# Patient Record
Sex: FEMALE | Race: WHITE | NOT HISPANIC OR LATINO | Employment: STUDENT | URBAN - METROPOLITAN AREA
[De-identification: names, ages, dates, MRNs, and addresses within clinical notes are randomized per-mention and may not be internally consistent; named-entity substitution may affect disease eponyms.]

---
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
## 2019-01-10 ENCOUNTER — OFFICE VISIT (OUTPATIENT)
Dept: OPHTHALMOLOGY | Facility: CLINIC | Age: 19
End: 2019-01-10
Payer: MEDICAID

## 2019-01-10 DIAGNOSIS — H50.32 ESOTROPIA, INTERMITTENT, ALTERNATING: Primary | ICD-10-CM

## 2019-01-10 PROCEDURE — 92060 PR SPECIAL EYE EVAL,SENSORIMOTOR: ICD-10-PCS | Mod: ,,, | Performed by: OPHTHALMOLOGY

## 2019-01-10 PROCEDURE — 92012 PR EYE EXAM, EST PATIENT,INTERMED: ICD-10-PCS | Mod: ,,, | Performed by: OPHTHALMOLOGY

## 2019-01-10 PROCEDURE — 92060 SENSORIMOTOR EXAMINATION: CPT | Mod: ,,, | Performed by: OPHTHALMOLOGY

## 2019-01-10 PROCEDURE — 92012 INTRM OPH EXAM EST PATIENT: CPT | Mod: ,,, | Performed by: OPHTHALMOLOGY

## 2019-01-10 RX ORDER — AMOXICILLIN 500 MG/1
500 CAPSULE ORAL 3 TIMES DAILY
Refills: 0 | COMMUNITY
Start: 2019-01-07

## 2019-01-10 NOTE — PROGRESS NOTES
HPI     04/05/18   17 Y/O F here today with concerns that glasses aren't helping   with diplopia and images are side by side and gets headaches w/o glasses   wear. stj  Has trouble refocusing from near to distance     +headaches   +blurred vision  -eye pain    Last edited by VASQUEZ Mosqueda Jr., MD on 1/10/2019 10:50 AM. (History)          ROS     Positive for: Eyes    Last edited by VASQUEZ Mosqueda Jr., MD on 1/10/2019 10:50 AM. (History)          Assessment /Plan     For exam results, see Encounter Report.    Esotropia, intermittent, alternating      Educated about alignment   Can consider increasing the prism correction or can Consider surgical correction to correct ET. The details of the surgical procedure were discussed. The risks of the procedure were identified and explained. Treatment alternatives were listed.    Procedure plan will be MR recession OU.  Use of adjustable suture to ensure a better outcome.      Betty understands procedure plan and would like to schedule surgery.     This service was scribed by Maria Luisa Strong for, and in the presence of Dr Mosqueda who personally performed this service.    Maria Luisa Strong, COA    Mar Mosqueda MD

## 2019-02-19 ENCOUNTER — TELEPHONE (OUTPATIENT)
Dept: OPHTHALMOLOGY | Facility: CLINIC | Age: 19
End: 2019-02-19

## 2019-02-19 NOTE — TELEPHONE ENCOUNTER
02/19/19  Trinh returned pt call but no answer and LVM. I did speak with pt mother and she notified me that pt is at work and will have her call me on tomorrow (02/20/19) regarding eye sx. stj 3:52p      ----- Message from Laurie Grider sent at 2/19/2019 11:35 AM CST -----  Contact: Betty Adams   Pt would like to speak with  nurse about the eye surgery ,pt can be reached at [phone] please thank you.

## 2019-02-19 NOTE — TELEPHONE ENCOUNTER
02/19/19  Trinh called and spoke with pt mother regarding scheduling appt for Strab. Repair. I have forwarded pt to [facility] for Scheduling in April. st 4:24p      ----- Message from Laurie Grider sent at 2/19/2019  4:01 PM CST -----  Contact: Betty Adams   Pt mother  returned the called back about the eye surgery,she can be reached at [phone] please thank you.

## 2019-02-27 ENCOUNTER — TELEPHONE (OUTPATIENT)
Dept: OPHTHALMOLOGY | Facility: CLINIC | Age: 19
End: 2019-02-27

## 2019-02-27 DIAGNOSIS — H50.32 ESOTROPIA, INTERMITTENT, ALTERNATING: Primary | ICD-10-CM

## 2019-02-28 ENCOUNTER — TELEPHONE (OUTPATIENT)
Dept: OPHTHALMOLOGY | Facility: CLINIC | Age: 19
End: 2019-02-28

## 2019-02-28 NOTE — TELEPHONE ENCOUNTER
----- Message from Vanda Abbott sent at 2/26/2019 10:31 AM CST -----  Contact: Betty  Needs Advice    Reason for call:Pt called to f/u on surgery, coverage, and costs.        Communication Preference:[phone]    Additional Information:    Spoke with pt.  Advised the surgery was booked as a non cosmetic procedure  Which is covered by Medicaid.  Advised that pre-services would call her if   She has any financial responsibility.  Pt agrees and understands.  [facility]

## 2019-04-23 ENCOUNTER — TELEPHONE (OUTPATIENT)
Dept: OPTOMETRY | Facility: CLINIC | Age: 19
End: 2019-04-23

## 2019-04-23 ENCOUNTER — TELEPHONE (OUTPATIENT)
Dept: OPHTHALMOLOGY | Facility: CLINIC | Age: 19
End: 2019-04-23

## 2019-04-23 NOTE — BRIEF OP NOTE
Brief Operative Note  Ophthalmology Service      Date of Procedure: 4/24/19     Attending Physician: VASQUEZ Mosqueda Jr., MD     Assistant: SIABELA Fernandez MD    Pre-Operative Diagnosis: Esotropia, intermittent, alternating [H50.32]     Post-Operative Diagnosis: Same as pre-operative diagnosis    Treatments/Procedures: Recess MR OD 5.0 mm wadj    Intraoperative Findings: nl EOM's    Anesthesia: General    Complications: None    Estimated Blood Loss: < 5 cc    Specimens: None    -------------------------------------------------------------  Full dictated Operative Report to follow.  -------------------------------------------------------------

## 2019-04-23 NOTE — TELEPHONE ENCOUNTER
----- Message from Laurie Grider sent at 4/23/2019 11:17 AM CDT -----  Contact: Betty Adams   Pt would like to speak with  nurse please about the time for eye surgery,pt can be reached at [phone]  please thank you.

## 2019-04-23 NOTE — OP NOTE
DATE OF PROCEDURE: 4/24/19   SURGEON:  VASQUEZ Mosqueda M.D.     ASSISTANT:  Dr. ISABELA Fernandez     PREOPERATIVE DIAGNOSIS:  Strabismus-esotropia.     POSTOPERATIVE DIAGNOSIS:  Strabismus-esotropia.     PROCEDURE:  Recession, medial rectus, R eye, 5.0 mm with adjustable suture,      COMPLICATIONS:  None.     BLOOD LOSS:  Less than 2 mL.     PROCEDURE IN DETAIL:  The patient was brought to the Operating Suite where   general intubation anesthesia was achieved.  Both eyes were prepped and draped   in sterile fashion, lid speculum placed in the right eye.  Through an inferior   nasal fornix incision, the rectus muscle was identified and placed on a muscle   hook.  It was cleared of its check ligaments anteriorly and posteriorly and   double-armed 6-0 Vicryl suture passed through the muscle belly 1 mm posterior to   the insertion.  Locked bites were placed in the middle and upper and lower edge   of the muscle.  The muscle was disinserted from the globe and reattached to the   sclera at the old insertion site.  A noose suture was placed around the muscle   suture and positioned 5.0 mm distally.  The muscle was allowed to retract back   this amount.  The conjunctiva was reapproximated and the sutures were buried.   Maxitrol ointment and   Betadine solution was placed in the eye.  The patient was brought to the   Recovery Room in good condition.

## 2019-04-24 ENCOUNTER — ANESTHESIA (OUTPATIENT)
Dept: SURGERY | Facility: HOSPITAL | Age: 19
End: 2019-04-24
Payer: MEDICAID

## 2019-04-24 ENCOUNTER — ANESTHESIA EVENT (OUTPATIENT)
Dept: SURGERY | Facility: HOSPITAL | Age: 19
End: 2019-04-24
Payer: MEDICAID

## 2019-04-24 ENCOUNTER — HOSPITAL ENCOUNTER (OUTPATIENT)
Facility: HOSPITAL | Age: 19
Discharge: HOME | End: 2019-04-24
Attending: OPHTHALMOLOGY | Admitting: OPHTHALMOLOGY
Payer: MEDICAID

## 2019-04-24 VITALS
SYSTOLIC BLOOD PRESSURE: 101 MMHG | HEIGHT: 61 IN | OXYGEN SATURATION: 99 % | TEMPERATURE: 98 F | HEART RATE: 57 BPM | WEIGHT: 140 LBS | DIASTOLIC BLOOD PRESSURE: 68 MMHG | RESPIRATION RATE: 17 BRPM | BODY MASS INDEX: 26.43 KG/M2

## 2019-04-24 DIAGNOSIS — H50.32 ESOTROPIA, INTERMITTENT, ALTERNATING: Primary | ICD-10-CM

## 2019-04-24 DIAGNOSIS — H50.00 ESOTROPIA: ICD-10-CM

## 2019-04-24 LAB
B-HCG UR QL: NEGATIVE
CTP QC/QA: YES

## 2019-04-24 PROCEDURE — 63600175 PHARM REV CODE 636 W HCPCS: Performed by: NURSE ANESTHETIST, CERTIFIED REGISTERED

## 2019-04-24 PROCEDURE — 71000044 HC DOSC ROUTINE RECOVERY FIRST HOUR: Performed by: OPHTHALMOLOGY

## 2019-04-24 PROCEDURE — 67335 EYE SUTURE DURING SURGERY: CPT | Mod: RT,,, | Performed by: OPHTHALMOLOGY

## 2019-04-24 PROCEDURE — 00140 ANES PROCEDURES ON EYE NOS: CPT | Performed by: OPHTHALMOLOGY

## 2019-04-24 PROCEDURE — 67311 PR STABISMUS SURG,ONE HORIZ MUSCLE: ICD-10-PCS | Mod: RT,,, | Performed by: OPHTHALMOLOGY

## 2019-04-24 PROCEDURE — D9220A PRA ANESTHESIA: Mod: ANES,,, | Performed by: ANESTHESIOLOGY

## 2019-04-24 PROCEDURE — 25000003 PHARM REV CODE 250: Performed by: NURSE ANESTHETIST, CERTIFIED REGISTERED

## 2019-04-24 PROCEDURE — 36000707: Performed by: OPHTHALMOLOGY

## 2019-04-24 PROCEDURE — 71000015 HC POSTOP RECOV 1ST HR: Performed by: OPHTHALMOLOGY

## 2019-04-24 PROCEDURE — 37000008 HC ANESTHESIA 1ST 15 MINUTES: Performed by: OPHTHALMOLOGY

## 2019-04-24 PROCEDURE — 37000009 HC ANESTHESIA EA ADD 15 MINS: Performed by: OPHTHALMOLOGY

## 2019-04-24 PROCEDURE — 63600175 PHARM REV CODE 636 W HCPCS: Performed by: ANESTHESIOLOGY

## 2019-04-24 PROCEDURE — 25000003 PHARM REV CODE 250: Performed by: OPHTHALMOLOGY

## 2019-04-24 PROCEDURE — 67335 PR STABISMUS SURG,PLACE ADJUST SUTURE: ICD-10-PCS | Mod: RT,,, | Performed by: OPHTHALMOLOGY

## 2019-04-24 PROCEDURE — 81025 URINE PREGNANCY TEST: CPT | Performed by: ANESTHESIOLOGY

## 2019-04-24 PROCEDURE — D9220A PRA ANESTHESIA: Mod: CRNA,,, | Performed by: NURSE ANESTHETIST, CERTIFIED REGISTERED

## 2019-04-24 PROCEDURE — 36000706: Performed by: OPHTHALMOLOGY

## 2019-04-24 PROCEDURE — D9220A PRA ANESTHESIA: ICD-10-PCS | Mod: ANES,,, | Performed by: ANESTHESIOLOGY

## 2019-04-24 PROCEDURE — 67311 REVISE EYE MUSCLE: CPT | Mod: RT,,, | Performed by: OPHTHALMOLOGY

## 2019-04-24 PROCEDURE — D9220A PRA ANESTHESIA: ICD-10-PCS | Mod: CRNA,,, | Performed by: NURSE ANESTHETIST, CERTIFIED REGISTERED

## 2019-04-24 RX ORDER — DEXAMETHASONE SODIUM PHOSPHATE 4 MG/ML
INJECTION, SOLUTION INTRA-ARTICULAR; INTRALESIONAL; INTRAMUSCULAR; INTRAVENOUS; SOFT TISSUE
Status: DISCONTINUED | OUTPATIENT
Start: 2019-04-24 | End: 2019-04-24

## 2019-04-24 RX ORDER — ACETAMINOPHEN 325 MG/1
650 TABLET ORAL ONCE AS NEEDED
Status: COMPLETED | OUTPATIENT
Start: 2019-04-24 | End: 2019-04-24

## 2019-04-24 RX ORDER — ACETAMINOPHEN 325 MG/1
TABLET ORAL
Status: DISCONTINUED
Start: 2019-04-24 | End: 2019-04-24 | Stop reason: HOSPADM

## 2019-04-24 RX ORDER — NEOMYCIN SULFATE, POLYMYXIN B SULFATE, AND DEXAMETHASONE 3.5; 10000; 1 MG/G; [USP'U]/G; MG/G
OINTMENT OPHTHALMIC
Status: DISCONTINUED | OUTPATIENT
Start: 2019-04-24 | End: 2019-04-24 | Stop reason: HOSPADM

## 2019-04-24 RX ORDER — NEOMYCIN SULFATE, POLYMYXIN B SULFATE, AND DEXAMETHASONE 3.5; 10000; 1 MG/G; [USP'U]/G; MG/G
OINTMENT OPHTHALMIC
Status: DISCONTINUED
Start: 2019-04-24 | End: 2019-04-24 | Stop reason: HOSPADM

## 2019-04-24 RX ORDER — LIDOCAINE HCL/PF 100 MG/5ML
SYRINGE (ML) INTRAVENOUS
Status: DISCONTINUED | OUTPATIENT
Start: 2019-04-24 | End: 2019-04-24

## 2019-04-24 RX ORDER — FENTANYL CITRATE 50 UG/ML
INJECTION, SOLUTION INTRAMUSCULAR; INTRAVENOUS
Status: DISCONTINUED | OUTPATIENT
Start: 2019-04-24 | End: 2019-04-24

## 2019-04-24 RX ORDER — PHENYLEPHRINE HYDROCHLORIDE 25 MG/ML
SOLUTION/ DROPS OPHTHALMIC
Status: DISCONTINUED
Start: 2019-04-24 | End: 2019-04-24 | Stop reason: HOSPADM

## 2019-04-24 RX ORDER — MIDAZOLAM HYDROCHLORIDE 1 MG/ML
INJECTION, SOLUTION INTRAMUSCULAR; INTRAVENOUS
Status: DISCONTINUED | OUTPATIENT
Start: 2019-04-24 | End: 2019-04-24

## 2019-04-24 RX ORDER — NEOMYCIN SULFATE, POLYMYXIN B SULFATE, AND DEXAMETHASONE 3.5; 10000; 1 MG/G; [USP'U]/G; MG/G
OINTMENT OPHTHALMIC 3 TIMES DAILY
Qty: 3.5 G | Refills: 0
Start: 2019-04-24

## 2019-04-24 RX ORDER — SODIUM CHLORIDE 0.9 % (FLUSH) 0.9 %
3 SYRINGE (ML) INJECTION
Status: DISCONTINUED | OUTPATIENT
Start: 2019-04-24 | End: 2019-04-24 | Stop reason: HOSPADM

## 2019-04-24 RX ORDER — GLYCOPYRROLATE 0.2 MG/ML
INJECTION INTRAMUSCULAR; INTRAVENOUS
Status: DISCONTINUED | OUTPATIENT
Start: 2019-04-24 | End: 2019-04-24

## 2019-04-24 RX ORDER — ONDANSETRON 2 MG/ML
4 INJECTION INTRAMUSCULAR; INTRAVENOUS DAILY PRN
Status: DISCONTINUED | OUTPATIENT
Start: 2019-04-24 | End: 2019-04-24 | Stop reason: HOSPADM

## 2019-04-24 RX ORDER — PROPOFOL 10 MG/ML
VIAL (ML) INTRAVENOUS
Status: DISCONTINUED | OUTPATIENT
Start: 2019-04-24 | End: 2019-04-24

## 2019-04-24 RX ORDER — ONDANSETRON 2 MG/ML
INJECTION INTRAMUSCULAR; INTRAVENOUS
Status: DISCONTINUED | OUTPATIENT
Start: 2019-04-24 | End: 2019-04-24

## 2019-04-24 RX ORDER — FENTANYL CITRATE 50 UG/ML
25 INJECTION, SOLUTION INTRAMUSCULAR; INTRAVENOUS EVERY 5 MIN PRN
Status: DISCONTINUED | OUTPATIENT
Start: 2019-04-24 | End: 2019-04-24 | Stop reason: HOSPADM

## 2019-04-24 RX ORDER — SODIUM CHLORIDE 9 MG/ML
INJECTION, SOLUTION INTRAVENOUS CONTINUOUS PRN
Status: DISCONTINUED | OUTPATIENT
Start: 2019-04-24 | End: 2019-04-24

## 2019-04-24 RX ORDER — PHENYLEPHRINE HYDROCHLORIDE 25 MG/ML
SOLUTION/ DROPS OPHTHALMIC
Status: DISCONTINUED | OUTPATIENT
Start: 2019-04-24 | End: 2019-04-24 | Stop reason: HOSPADM

## 2019-04-24 RX ADMIN — FENTANYL CITRATE 25 MCG: 50 INJECTION INTRAMUSCULAR; INTRAVENOUS at 09:04

## 2019-04-24 RX ADMIN — LIDOCAINE HYDROCHLORIDE 75 MG: 20 INJECTION, SOLUTION INTRAVENOUS at 08:04

## 2019-04-24 RX ADMIN — FENTANYL CITRATE 50 MCG: 50 INJECTION, SOLUTION INTRAMUSCULAR; INTRAVENOUS at 08:04

## 2019-04-24 RX ADMIN — GLYCOPYRROLATE 0.2 MG: 0.2 INJECTION, SOLUTION INTRAMUSCULAR; INTRAVENOUS at 08:04

## 2019-04-24 RX ADMIN — MIDAZOLAM HYDROCHLORIDE 2 MG: 1 INJECTION, SOLUTION INTRAMUSCULAR; INTRAVENOUS at 07:04

## 2019-04-24 RX ADMIN — ACETAMINOPHEN 650 MG: 325 TABLET ORAL at 09:04

## 2019-04-24 RX ADMIN — SODIUM CHLORIDE: 0.9 INJECTION, SOLUTION INTRAVENOUS at 07:04

## 2019-04-24 RX ADMIN — ONDANSETRON 4 MG: 2 INJECTION INTRAMUSCULAR; INTRAVENOUS at 08:04

## 2019-04-24 RX ADMIN — PROPOFOL 200 MG: 10 INJECTION, EMULSION INTRAVENOUS at 08:04

## 2019-04-24 RX ADMIN — DEXAMETHASONE SODIUM PHOSPHATE 4 MG: 4 INJECTION, SOLUTION INTRAMUSCULAR; INTRAVENOUS at 08:04

## 2019-04-24 NOTE — H&P
Pre-Operative History & Physical  Ophthalmology      SUBJECTIVE:     History of Present Illness:  Patient is a 19 y.o. female presents with intermittent alternating esotropia    MEDICATIONS:   PTA Medications   Medication Sig    amoxicillin (AMOXIL) 500 MG capsule Take 500 mg by mouth 3 (three) times daily.    spironolactone (ALDACTONE) 50 MG tablet Take 50 mg by mouth once daily.       ALLERGIES: Review of patient's allergies indicates:  No Known Allergies    PAST MEDICAL HISTORY:   Past Medical History:   Diagnosis Date    Esotropia, intermittent, alternating 4/5/2018     PAST SURGICAL HISTORY:   Past Surgical History:   Procedure Laterality Date    TONSILLECTOMY      WISDOM TOOTH EXTRACTION       PAST FAMILY HISTORY: History reviewed. No pertinent family history.  SOCIAL HISTORY:   Social History     Tobacco Use    Smoking status: Never Smoker    Smokeless tobacco: Never Used   Substance Use Topics    Alcohol use: No    Drug use: No        MENTAL STATUS: Alert    REVIEW OF SYSTEMS: Negative    OBJECTIVE:     Vital Signs (Most Recent)  Temp: 98.2 °F (36.8 °C) (04/24/19 0641)  Pulse: 61 (04/24/19 0641)  Resp: 18 (04/24/19 0641)  BP: 111/64 (04/24/19 0641)  SpO2: 100 % (04/24/19 0641)    Physical Exam:  General: NAD  HEENT: esotropia  Lungs: Adequate respirations  Heart: + pulses  Abdomen: Soft    ASSESSMENT/PLAN:     Patient is a 19 y.o. female with intermittent alternating esotropia     - Plan for bilateral eye muscle surgery   - Risks/benefits/alternatives of the procedure discussed with the patient   - Informed consent obtained prior to surgery and the patient voiced good understanding.

## 2019-04-24 NOTE — DISCHARGE INSTRUCTIONS
See Dr. Mosqueda attached discharge instruction sheet as well    Expect to see some bloody tears  Gently blot away tears with clean cloth or tissue  Keep water out of the eye for 5 days  Do not rub eye  Take tylenol and ibuprofen as needed for pain  May use ice pack as well to help with pain and swelling  You may see some bruising, this is normal and will go away with time    Follow up within 4 weeks, Dr. Mosqueda nurse will call you with appointment       Recovery After Procedural Sedation (Adult)  You have been given medicine by vein to make you sleep during your surgery. This may have included both a pain medicine and sleeping medicine. Most of the effects have worn off. But you may still have some drowsiness for the next 6 to 8 hours.  Home care  Follow these guidelines when you get home:  · For the next 8 hours, you should be watched by a responsible adult. This person should make sure your condition is not getting worse.  · Don't drink any alcohol for the next 24 hours.  · Don't drive, operate dangerous machinery, or make important business or personal decisions during the next 24 hours.  Note: Your healthcare provider may tell you not to take any medicine by mouth for pain or sleep in the next 4 hours. These medicines may react with the medicines you were given in the hospital. This could cause a much stronger response than usual.  Follow-up care  Follow up with your healthcare provider if you are not alert and back to your usual level of activity within 12 hours.  When to seek medical advice  Call your healthcare provider right away if any of these occur:  · Drowsiness gets worse  · Weakness or dizziness gets worse  · Repeated vomiting  · You can't be awakened   Date Last Reviewed: 10/18/2016  © 8831-2080 [employer]. [address] [place] [address], [place] [address]. All rights reserved. This information is not intended as a substitute for professional medical care. Always follow your healthcare  professional's instructions.

## 2019-04-24 NOTE — DISCHARGE SUMMARY
Discharge Summary  Ophthalmology Service    Admit Date: 4/24/2019     Discharge Date: 4/24/2019     Attending Physician: VASQUEZ Mosqueda Jr., MD     Discharge Physician: Lewis Fernandez MD    Discharged Condition: Good    Reason for Admission: Esotropia, intermittent, alternating [H50.32]  Esotropia [H50.00]     Treatments/Procedures: Right Eye Medial Rectus Recession with adjustable suture (see dictated report for details).    Hospital Course: Stable, dictated    Consults: None    Significant Diagnostic Studies: None    Disposition: Home    Patient Instructions:   - Resume same diet as prior to surgery  - Resume activity as tolerated  - Apply ice packs to surgical eye(s) for 72 hours as tolerated  - Call the Ophthalmology clinic to schedule an appointment with Dr. Mosqueda in 4-6 week(s).    Patient Instructions:   Current Discharge Medication List      START taking these medications    Details   neomycin-polymyxin-dexamethasone (MAXITROL) 3.5 mg/g-10,000 unit/g-0.1 % Oint Place into the right eye 3 (three) times daily.  Qty: 3.5 g, Refills: 0         CONTINUE these medications which have NOT CHANGED    Details   amoxicillin (AMOXIL) 500 MG capsule Take 500 mg by mouth 3 (three) times daily.  Refills: 0      spironolactone (ALDACTONE) 50 MG tablet Take 50 mg by mouth once daily.              Discharge Procedure Orders   Diet Adult Regular     Notify your health care provider if you experience any of the following:  severe uncontrolled pain     Notify your health care provider if you experience any of the following:  persistent nausea and vomiting or diarrhea     No dressing needed     Activity as tolerated

## 2019-04-24 NOTE — PLAN OF CARE
"Discharge instructions reviewed with pt and grandparents, handouts and eye ointment given,  verbalized understanding with no further questions at this time. Pt will call to schedule post op appointment in [place], [place] per AVS sheet with MD telephone number provided. VSS on RA, pain medication administered per MAR, states tolerable "5/10" on pain scale, no nausea noted, tolerating po fluids without difficulty, no other complaints noted. Fall precautions reviewed, consents in chart, PIV removed to at discharge after eye adjustment.   "

## 2019-04-24 NOTE — TRANSFER OF CARE
"Anesthesia Transfer of Care Note    Patient: Betty Adams    Procedure(s) Performed: Procedure(s) (LRB):  STRABISMUS SURGERY (Right)    Patient location: PACU    Anesthesia Type: general    Transport from OR: Transported from OR on room air with adequate spontaneous ventilation    Post pain: adequate analgesia    Post assessment: no apparent anesthetic complications and tolerated procedure well    Post vital signs: stable    Level of consciousness: awake    Nausea/Vomiting: no nausea/vomiting    Complications: none    Transfer of care protocol was followed      Last vitals:   Visit Vitals  /64 (BP Location: Left arm, Patient Position: Lying)   Pulse 61   Temp 36.8 °C (98.2 °F) (Oral)   Resp 18   Ht 5' 1" (1.549 m)   Wt 63.5 kg (140 lb)   LMP  (Within Years)   SpO2 100%   Breastfeeding? No   BMI 26.45 kg/m²     "

## 2019-04-24 NOTE — ANESTHESIA POSTPROCEDURE EVALUATION
Anesthesia Post Evaluation    Patient: Betty Adams    Procedure(s) Performed: Procedure(s) (LRB):  STRABISMUS SURGERY (Right)    Final Anesthesia Type: general  Patient location during evaluation: PACU  Patient participation: Yes- Able to Participate  Level of consciousness: awake and alert and oriented  Post-procedure vital signs: reviewed and stable  Pain management: adequate  Airway patency: patent  PONV status at discharge: No PONV  Anesthetic complications: no      Cardiovascular status: blood pressure returned to baseline  Respiratory status: unassisted  Hydration status: euvolemic  Follow-up not needed.          Vitals Value Taken Time   /68 4/24/2019 10:00 AM   Temp 36.7 °C (98.1 °F) 4/24/2019 10:00 AM   Pulse 57 4/24/2019 10:00 AM   Resp 17 4/24/2019 10:00 AM   SpO2 99 % 4/24/2019 10:00 AM         No case tracking events are documented in the log.      Pain/Charly Score: Pain Rating Prior to Med Admin: 5 (4/24/2019 10:00 AM)  Pain Rating Post Med Admin: 5 (4/24/2019 10:00 AM)  Charly Score: 10 (4/24/2019 10:00 AM)

## 2019-04-24 NOTE — ANESTHESIA PREPROCEDURE EVALUATION
04/24/2019  Betty Adams is a 19 y.o., female with no pmh here for strabismus surgery.     Anesthesia Evaluation    I have reviewed the Patient Summary Reports.     I have reviewed the Medications.     Review of Systems  Anesthesia Hx:  No problems with previous Anesthesia  Neg history of prior surgery. Denies Family Hx of Anesthesia complications.   Denies Personal Hx of Anesthesia complications.   Social:  Non-Smoker    EENT/Dental:   esotropia   Cardiovascular:   Exercise tolerance: good Denies Valvular problems/Murmurs.     Pulmonary:   Denies Asthma.  Denies Recent URI.    Renal/:  Renal/ Normal     Hepatic/GI:  Hepatic/GI Normal    Musculoskeletal:  Musculoskeletal Normal    Neurological:   Denies Seizures.    Endocrine:  Endocrine Normal    Psych:  Psychiatric Normal           Physical Exam  General:  Well nourished    Airway/Jaw/Neck:  Airway Findings: Mouth Opening: Normal Tongue: Normal  General Airway Assessment: Adult  Mallampati: I  TM Distance: Normal, at least 6 cm  Jaw/Neck Findings:  Neck ROM: Normal ROM      Dental:  Dental Findings: In tact   Chest/Lungs:  Chest/Lungs Findings: Clear to auscultation, Normal Respiratory Rate     Heart/Vascular:  Heart Findings: Rate: Normal  Rhythm: Regular Rhythm  Sounds: Normal        Mental Status:  Mental Status Findings: Normal        Anesthesia Plan  Type of Anesthesia, risks & benefits discussed:  Anesthesia Type:  general, MAC  Patient's Preference:   Intra-op Monitoring Plan: standard ASA monitors  Intra-op Monitoring Plan Comments:   Post Op Pain Control Plan: multimodal analgesia, IV/PO Opioids PRN and per primary service following discharge from PACU  Post Op Pain Control Plan Comments:   Induction:   IV  Beta Blocker:  Patient is not currently on a Beta-Blocker (No further documentation required).       Informed Consent: Patient understands  risks and agrees with Anesthesia plan.  Questions answered. Anesthesia consent signed with patient.  ASA Score: 1     Day of Surgery Review of History & Physical:    H&P update referred to the surgeon.         Ready For Surgery From Anesthesia Perspective.

## 2019-06-10 ENCOUNTER — OFFICE VISIT (OUTPATIENT)
Dept: OPHTHALMOLOGY | Facility: CLINIC | Age: 19
End: 2019-06-10
Payer: MEDICAID

## 2019-06-10 DIAGNOSIS — Z98.890 POST-OPERATIVE STATE: Primary | ICD-10-CM

## 2019-06-10 PROCEDURE — 99024 PR POST-OP FOLLOW-UP VISIT: ICD-10-PCS | Mod: ,,, | Performed by: OPHTHALMOLOGY

## 2019-06-10 PROCEDURE — 99024 POSTOP FOLLOW-UP VISIT: CPT | Mod: ,,, | Performed by: OPHTHALMOLOGY

## 2019-06-10 NOTE — PROGRESS NOTES
HPI     DLS 01/10/2019    20 YO F here for her 1 mo PO Strabismus Repair ck. Pt reports:no   complications after eye Sx. Pt is concerned of scarring nasally after this   type of procedure due to redness nasal corner of right eye. No signs of   diplopia.       POHx:   1. Strabismus-esotropia.   S/P  Recession, medial rectus, R eye, 5.0 mm with adjustable suture,   04/29/2019    Last edited by Trinh Cohen MA on 6/10/2019 12:06 PM. (History)            Assessment /Plan     For exam results, see Encounter Report.    Post-operative state      The patient has had the desired result of the surgical procedure.

## 2020-07-06 ENCOUNTER — HISTORICAL (OUTPATIENT)
Dept: ADMINISTRATIVE | Facility: HOSPITAL | Age: 20
End: 2020-07-06

## 2020-12-18 ENCOUNTER — HOSPITAL ENCOUNTER (OUTPATIENT)
Dept: PREADMISSION TESTING | Facility: HOSPITAL | Age: 20
Discharge: HOME | End: 2020-12-18
Attending: FAMILY MEDICINE
Payer: MEDICAID

## 2020-12-18 DIAGNOSIS — R09.82 ALLERGIC RHINITIS WITH POSTNASAL DRIP: ICD-10-CM

## 2020-12-18 DIAGNOSIS — J30.9 ALLERGIC RHINITIS WITH POSTNASAL DRIP: ICD-10-CM

## 2020-12-18 DIAGNOSIS — U07.1 COVID-19 VIRUS DETECTED: ICD-10-CM

## 2020-12-18 LAB — SARS-COV-2 RNA RESP QL NAA+PROBE: DETECTED

## 2020-12-18 PROCEDURE — U0002 COVID-19 LAB TEST NON-CDC: HCPCS

## 2020-12-30 ENCOUNTER — HOSPITAL ENCOUNTER (OUTPATIENT)
Dept: PREADMISSION TESTING | Facility: HOSPITAL | Age: 20
Discharge: HOME | End: 2020-12-30
Attending: FAMILY MEDICINE
Payer: MEDICAID

## 2020-12-30 DIAGNOSIS — U07.1 CLINICAL DIAGNOSIS OF COVID-19: Primary | ICD-10-CM

## 2020-12-30 LAB — SARS-COV-2 RNA RESP QL NAA+PROBE: DETECTED

## 2020-12-30 PROCEDURE — U0002 COVID-19 LAB TEST NON-CDC: HCPCS

## 2021-12-14 ENCOUNTER — OFFICE VISIT (OUTPATIENT)
Dept: OPHTHALMOLOGY | Facility: CLINIC | Age: 21
End: 2021-12-14
Payer: MEDICAID

## 2021-12-14 DIAGNOSIS — Z98.890 HISTORY OF STRABISMUS SURGERY: ICD-10-CM

## 2021-12-14 DIAGNOSIS — H52.7 REFRACTIVE ERROR: ICD-10-CM

## 2021-12-14 DIAGNOSIS — H50.51 ESOPHORIA: Primary | ICD-10-CM

## 2021-12-14 PROCEDURE — 92060 SENSORIMOTOR EXAMINATION: CPT | Mod: ,,, | Performed by: OPHTHALMOLOGY

## 2021-12-14 PROCEDURE — 92012 PR EYE EXAM, EST PATIENT,INTERMED: ICD-10-PCS | Mod: ,,, | Performed by: OPHTHALMOLOGY

## 2021-12-14 PROCEDURE — 92012 INTRM OPH EXAM EST PATIENT: CPT | Mod: ,,, | Performed by: OPHTHALMOLOGY

## 2021-12-14 PROCEDURE — 92060 PR SPECIAL EYE EVAL,SENSORIMOTOR: ICD-10-PCS | Mod: ,,, | Performed by: OPHTHALMOLOGY

## (undated) DEVICE — SUT 6/0 18IN COATED VICRYL

## (undated) DEVICE — TRAY MUSCLE LID EYE

## (undated) DEVICE — SOL BETADINE 5%

## (undated) DEVICE — FORCEP CURVED DISP

## (undated) DEVICE — SEE MEDLINE ITEM 157128

## (undated) DEVICE — CORD BIPOLAR 12 FOOT

## (undated) DEVICE — DRESSING TRANS 2X2 TEGADERM

## (undated) DEVICE — SHEET EENT SPLIT